# Patient Record
Sex: FEMALE | Race: WHITE | NOT HISPANIC OR LATINO | Employment: FULL TIME | URBAN - METROPOLITAN AREA
[De-identification: names, ages, dates, MRNs, and addresses within clinical notes are randomized per-mention and may not be internally consistent; named-entity substitution may affect disease eponyms.]

---

## 2017-04-17 ENCOUNTER — COMMUNICATION - HEALTHEAST (OUTPATIENT)
Dept: SCHEDULING | Facility: CLINIC | Age: 37
End: 2017-04-17

## 2017-10-06 ENCOUNTER — OFFICE VISIT - HEALTHEAST (OUTPATIENT)
Dept: FAMILY MEDICINE | Facility: CLINIC | Age: 37
End: 2017-10-06

## 2017-10-06 DIAGNOSIS — J01.90 ACUTE SINUSITIS: ICD-10-CM

## 2017-10-06 ASSESSMENT — MIFFLIN-ST. JEOR: SCORE: 1317.82

## 2018-05-11 ENCOUNTER — OFFICE VISIT - HEALTHEAST (OUTPATIENT)
Dept: FAMILY MEDICINE | Facility: CLINIC | Age: 38
End: 2018-05-11

## 2018-05-11 DIAGNOSIS — Z71.84 TRAVEL ADVICE ENCOUNTER: ICD-10-CM

## 2018-05-11 RX ORDER — ATOVAQUONE AND PROGUANIL HYDROCHLORIDE 250; 100 MG/1; MG/1
1 TABLET, FILM COATED ORAL
Qty: 14 TABLET | Refills: 0 | Status: SHIPPED | OUTPATIENT
Start: 2018-05-11

## 2018-05-11 ASSESSMENT — MIFFLIN-ST. JEOR: SCORE: 1290.6

## 2018-06-28 ENCOUNTER — COMMUNICATION - HEALTHEAST (OUTPATIENT)
Dept: SCHEDULING | Facility: CLINIC | Age: 38
End: 2018-06-28

## 2021-05-30 ENCOUNTER — RECORDS - HEALTHEAST (OUTPATIENT)
Dept: ADMINISTRATIVE | Facility: CLINIC | Age: 41
End: 2021-05-30

## 2021-05-31 ENCOUNTER — RECORDS - HEALTHEAST (OUTPATIENT)
Dept: ADMINISTRATIVE | Facility: CLINIC | Age: 41
End: 2021-05-31

## 2021-05-31 VITALS — BODY MASS INDEX: 22.5 KG/M2 | HEIGHT: 66 IN | WEIGHT: 140 LBS

## 2021-06-01 VITALS — HEIGHT: 66 IN | WEIGHT: 134 LBS | BODY MASS INDEX: 21.53 KG/M2

## 2021-06-13 NOTE — PROGRESS NOTES
"  Chief Complaint   Patient presents with     Cough     productive x5-6 days         HPI:   Dayanara Roman is a 37 y.o. female has been sick for the last week.  Started with sore throat, developed cough, productive.  Mild shortness of breath.  No fever.   Got sick quickly.  Looser stools. No vomiting.  Some ear pain, pressure with flight earlier this week.  Intermittent sore throat.  No myalgias.  No dysuria.  No rash.  Intermittent headache.  No history of asthma.  No history of allergies.   Aleve--hoping it would help with chest congestion and post nasal drainage.  States she gets a cold and then gets a bad cough with productive cough. Never happens in the summer.      ROS:  Constitutional: no fever  Eyes: negative   ENT: as per HPI  Respiratory: as per HPI   CV: no chest pain  GI: as per HPI  : as per HPI  SKIN: no rash  MS: as per HPI  NEURO: as per HPI     Medications:  Current Outpatient Prescriptions on File Prior to Visit   Medication Sig Dispense Refill     etonogestrel-ethinyl estradiol (NUVARING) 0.12-0.015 mg/24 hr vaginal ring Insert 1 each into the vagina every 28 days. Insert vaginally and leave in place for 3 consecutive weeks, then remove for 1 week.       adapalene (DIFFERIN) 0.1 % cream as needed.        No current facility-administered medications on file prior to visit.          Social History:  Social History   Substance Use Topics     Smoking status: Never Smoker     Smokeless tobacco: Never Used     Alcohol use 6.0 oz/week     10 Glasses of wine per week         Physical Exam:   Vitals:    10/06/17 0949   BP: 110/64   Patient Site: Right Arm   Patient Position: Sitting   Cuff Size: Adult Regular   Pulse: 72   Resp: 16   Temp: 98  F (36.7  C)   TempSrc: Oral   Weight: 140 lb (63.5 kg)   Height: 5' 5.75\" (1.67 m)       GENERAL:   Alert. Oriented.  EYES: Clear  HENT:  Ears: R TM pearly gray. Normal landmarks. L TM pearly gray.  Normal landmarks  Nose: Clear.  Sinuses: Nontender.  Oropharynx:  " No erythema. No exudate.  NECK: Supple. No adenopathy.  LUNGS: Clear to ascultation.  No crackles.  No wheezing  HEART: RRR  SKIN:  No rash.         Assessment/Plan:    1. Acute sinusitis  albuterol sulfate 90 mcg/actuation AePB    fluticasone (FLONASE) 50 mcg/actuation nasal spray      Appears viral with post nasal drainage contributing to cough.  She doesn't give a clear h/o allergies or asthma, although she states she was sick a lot as a kid.  Recommended afrin no longer than 4 days, sudafed.    Start fluticasone nasal spray--plan to use for a month.  Albuterol inhaler 2 puffs tid and prn 7-10 days then as needed.  Cough syrup as needed.    There is nothing alarming in her history.  She could have some environmental allergies contributing to the symptoms that start with a cold.  May have subclinical wheezing.  Discussed starting antihistamine in the fall and using through the winter,  Starting decongestant and fluticasone as soon as she gets a cold.      Recheck for fever, worsening or not improving.        The following portions of the patient's history were reviewed and updated as appropriate: allergies, current medications, past family history, past medical history, past social history, past surgical history and problem list.    Delon Still MD      10/6/2017         no

## 2021-06-17 NOTE — PROGRESS NOTES
"[facility] Note    Patient Name: Dayanara Roman  Patient Age: 37 y.o.  YOB: 1980  MRN: [id number]    Date of visit: 5/11/2018    Patient Active Problem List   Diagnosis     Gallbladder Disease     Dermatitis     Social History     Social History Narrative     Outpatient Encounter Prescriptions as of 5/11/2018   Medication Sig Dispense Refill     atovaquone-proguanil (MALARONE) 250-100 mg Tab Take 1 tablet by mouth daily with breakfast. 14 tablet 0     typhoid (VIVOTIF) SR capsule Take 1 capsule by mouth every other day for 4 doses. 4 capsule 0     [DISCONTINUED] albuterol sulfate 90 mcg/actuation AePB Inhale 2 puffs 3 (three) times a day. 1 each 11     [DISCONTINUED] etonogestrel-ethinyl estradiol (NUVARING) 0.12-0.015 mg/24 hr vaginal ring Insert 1 each into the vagina every 28 days. Insert vaginally and leave in place for 3 consecutive weeks, then remove for 1 week.       [DISCONTINUED] fluticasone (FLONASE) 50 mcg/actuation nasal spray 2 sprays into each nostril daily. 16 g 11     No facility-administered encounter medications on file as of 5/11/2018.        Chief Complaint:   Chief Complaint   Patient presents with     Travel Consult     Traveling to the [facility]        HPI:   Going to [facility] 5/28 for 5 days.  [place] [facility] [place]    Planned Activities: some indoors outside  Elevation/diving/wilderness: n  Chronic health conditions: n  Vaccination hx (routine and travel):has had yellow fever vaccine.  No hepatitis B vaccination  Medications: no  Any allergies or hx severe reaction (anaphylaxis):no  Plan for food: careful   Travel Insurance/evacuation insurance: has  Not trying to become pregnant right now.      Diptheria outbreak in [facility].     ROS: Pertinent ros findings in hpi, all other systems negative.    Objective/Physical Exam:     /68  Pulse 82  Ht 5' 5.75\" (1.67 m)  Wt 134 lb (60.8 kg)  BMI 21.79 kg/m2    Gen: NAD, conversant, appears age, " well-kempt  Skin: warm, dry, no rash, pallor cyanosis  HENT: normocephalic atraumatic, MMM, no oral lesions, otorrhea, rhinorrhea. TM's normal bilaterally.  Eyes: non-icteric, extra-ocular movements intact, PERRL, conjunctivae not injected. Holding eyes open comfortably, no drainage.  CV: NRRR no m/r/g, no peripheral edema. no JVD.  Resp: CTAB no w/r/r, normal respiratory effort  GI: soft, non-tender, non-distended. No masses.  MSK: no muscle or joint swelling.  Neuro: no dysarthria or gross asymmetry  Psych: full affect, oriented x 3  Lymph: No significant cervical lymphadenopathy  Hematologic: No petechiae or purpura.      Assessment/Plan:    Encounter Diagnoses   Name Primary?     Travel advice encounter Yes       Orders Placed This Encounter   Procedures     Tdap vaccine,  8yo or older,  IM       No results found for this or any previous visit (from the past 24 hour(s)).     Recommended vaccinations:Hepatitis A and B (if haven't had already - patient will obtain vaccine records and let me know). Typhoid.  Malaria prophylaxis:recommended - start malarone 5/27 (needs to be 10 days after last dose of vivotif which should be 5/17)  Traveler's diarrhea prophylaxis: pepto bismol    Food: Recommend only consuming food from reputable sources and avoiding street food.  Avoid fresh vegetables and fruits without a peel.  Eat only food that is fresh and steaming hot.    Drink: Stay hydrated.  Drink only bottled beverages that are still sealed.  Do not use ice.  Use sterile water (bottled, boiled and cooled, adequately filtered/treated) for drinking, brushing teeth.  Don't open mouth in shower.    Mosquito/insect avoidance: Cover skin with clothing as much as possible.  Apply insect repellant regularly.  Avoid being outdoors at times of day when local mosquitos are most active.  Use mosquito nets.  Can treat clothing with insect repellant.     Sun: Apply sunscreen regularly.    Swimming: Avoid swimming in freshwater.  Avoid  walking barefoot at any time including beaches.    Animals: Avoid local animals as much as possible.  If you are bitten by an animal, seek medical attention immediately for possible rabies treatment/prevention.    DVT (blood clot) prevention: Recommend getting up to walk every 1-2 hours while in transit. Frequently flex/extend ankles and knees. Avoid sedatives/alcohol. Consider wearing graduated compression stockings 15-30 mmhg.    Medical: Take along adequate supply of any medication you use regularly. Seek out a reputable clinic/hospital that you can utilize if needed.  I recommend that you acquire health insurance/evacuation insurance for your trip. Visit CDC website: https://wwwnc.cdc.gov/travel/page/getting-health-care-abroad    Infectious disease:  Standard infectious disease precautions should be exercised abroad regarding frequent hand washing/, sexual contact, etc. Avoid contact with body fluids when possible and seek medical attention if there is contact with body fluid.     Packing: I recommend that you consult the cdc recommendations for items to consider packing: https://wwwnc.cdc.gov/travel/page/pack-smart    Not currently pregnant or trying to become pregnant.  We did discuss that it would be recommended to not try to become pregnant within 2 months of travel (6 months for spouse).    Counseled patient regarding treatments, treatment options, risks and benefits and diagnosis.  The patient was interactive, attentive, verbalized understanding, and we discussed plan.     Augusto Argueta MD

## 2021-06-26 ENCOUNTER — HEALTH MAINTENANCE LETTER (OUTPATIENT)
Age: 41
End: 2021-06-26

## 2021-10-16 ENCOUNTER — HEALTH MAINTENANCE LETTER (OUTPATIENT)
Age: 41
End: 2021-10-16

## 2022-09-25 ENCOUNTER — HEALTH MAINTENANCE LETTER (OUTPATIENT)
Age: 42
End: 2022-09-25

## 2023-12-23 ENCOUNTER — HEALTH MAINTENANCE LETTER (OUTPATIENT)
Age: 43
End: 2023-12-23

## 2024-03-02 ENCOUNTER — HEALTH MAINTENANCE LETTER (OUTPATIENT)
Age: 44
End: 2024-03-02

## 2024-08-09 ENCOUNTER — HOSPITAL ENCOUNTER (EMERGENCY)
Facility: HOSPITAL | Age: 44
Discharge: HOME | End: 2024-08-09
Attending: EMERGENCY MEDICINE | Admitting: EMERGENCY MEDICINE
Payer: COMMERCIAL

## 2024-08-09 ENCOUNTER — TELEPHONE (OUTPATIENT)
Dept: FAMILY MEDICINE | Facility: CLINIC | Age: 44
End: 2024-08-09
Payer: COMMERCIAL

## 2024-08-09 VITALS
HEART RATE: 85 BPM | OXYGEN SATURATION: 98 % | SYSTOLIC BLOOD PRESSURE: 98 MMHG | RESPIRATION RATE: 19 BRPM | WEIGHT: 131.84 LBS | BODY MASS INDEX: 21.97 KG/M2 | TEMPERATURE: 98.5 F | HEIGHT: 65 IN | DIASTOLIC BLOOD PRESSURE: 54 MMHG

## 2024-08-09 DIAGNOSIS — E61.1 LOW IRON: ICD-10-CM

## 2024-08-09 LAB
ABO/RH(D): NORMAL
ALBUMIN SERPL BCG-MCNC: 4.8 G/DL (ref 3.5–5.2)
ALP SERPL-CCNC: 73 U/L (ref 40–150)
ALT SERPL W P-5'-P-CCNC: 18 U/L (ref 0–50)
ANION GAP SERPL CALCULATED.3IONS-SCNC: 15 MMOL/L (ref 7–15)
ANTIBODY SCREEN: NEGATIVE
AST SERPL W P-5'-P-CCNC: 17 U/L (ref 0–45)
BASOPHILS # BLD AUTO: 0 10E3/UL (ref 0–0.2)
BASOPHILS NFR BLD AUTO: 1 %
BILIRUB DIRECT SERPL-MCNC: <0.2 MG/DL (ref 0–0.3)
BILIRUB SERPL-MCNC: 0.5 MG/DL
BUN SERPL-MCNC: 8.4 MG/DL (ref 6–20)
CALCIUM SERPL-MCNC: 9.6 MG/DL (ref 8.8–10.4)
CHLORIDE SERPL-SCNC: 100 MMOL/L (ref 98–107)
CREAT SERPL-MCNC: 0.59 MG/DL (ref 0.51–0.95)
EGFRCR SERPLBLD CKD-EPI 2021: >90 ML/MIN/1.73M2
EOSINOPHIL # BLD AUTO: 0.1 10E3/UL (ref 0–0.7)
EOSINOPHIL NFR BLD AUTO: 4 %
ERYTHROCYTE [DISTWIDTH] IN BLOOD BY AUTOMATED COUNT: 12.2 % (ref 10–15)
GLUCOSE SERPL-MCNC: 86 MG/DL (ref 70–99)
HCG SERPL QL: NEGATIVE
HCO3 SERPL-SCNC: 24 MMOL/L (ref 22–29)
HCT VFR BLD AUTO: 39.2 % (ref 35–47)
HEMOCCULT STL QL: NEGATIVE
HGB BLD-MCNC: 13.2 G/DL (ref 11.7–15.7)
IMM GRANULOCYTES # BLD: 0 10E3/UL
IMM GRANULOCYTES NFR BLD: 0 %
IRON BINDING CAPACITY (ROCHE): 308 UG/DL (ref 240–430)
IRON SATN MFR SERPL: 10 % (ref 15–46)
IRON SERPL-MCNC: 31 UG/DL (ref 37–145)
LIPASE SERPL-CCNC: 33 U/L (ref 13–60)
LYMPHOCYTES # BLD AUTO: 1.8 10E3/UL (ref 0.8–5.3)
LYMPHOCYTES NFR BLD AUTO: 54 %
MAGNESIUM SERPL-MCNC: 2.1 MG/DL (ref 1.7–2.3)
MCH RBC QN AUTO: 30.3 PG (ref 26.5–33)
MCHC RBC AUTO-ENTMCNC: 33.7 G/DL (ref 31.5–36.5)
MCV RBC AUTO: 90 FL (ref 78–100)
MONOCYTES # BLD AUTO: 0.4 10E3/UL (ref 0–1.3)
MONOCYTES NFR BLD AUTO: 13 %
NEUTROPHILS # BLD AUTO: 0.9 10E3/UL (ref 1.6–8.3)
NEUTROPHILS NFR BLD AUTO: 28 %
NRBC # BLD AUTO: 0 10E3/UL
NRBC BLD AUTO-RTO: 0 /100
PLATELET # BLD AUTO: 178 10E3/UL (ref 150–450)
POTASSIUM SERPL-SCNC: 3.7 MMOL/L (ref 3.4–5.3)
PROT SERPL-MCNC: 8 G/DL (ref 6.4–8.3)
RBC # BLD AUTO: 4.36 10E6/UL (ref 3.8–5.2)
RETICS # AUTO: 0.03 10E6/UL (ref 0.03–0.1)
RETICS/RBC NFR AUTO: 0.7 % (ref 0.5–2)
SODIUM SERPL-SCNC: 139 MMOL/L (ref 135–145)
SPECIMEN EXPIRATION DATE: NORMAL
TSH SERPL DL<=0.005 MIU/L-ACNC: 1.6 UIU/ML (ref 0.3–4.2)
WBC # BLD AUTO: 3.4 10E3/UL (ref 4–11)

## 2024-08-09 PROCEDURE — 85025 COMPLETE CBC W/AUTO DIFF WBC: CPT | Performed by: EMERGENCY MEDICINE

## 2024-08-09 PROCEDURE — 80053 COMPREHEN METABOLIC PANEL: CPT | Performed by: EMERGENCY MEDICINE

## 2024-08-09 PROCEDURE — 99283 EMERGENCY DEPT VISIT LOW MDM: CPT

## 2024-08-09 PROCEDURE — 83735 ASSAY OF MAGNESIUM: CPT | Performed by: EMERGENCY MEDICINE

## 2024-08-09 PROCEDURE — 83540 ASSAY OF IRON: CPT | Performed by: EMERGENCY MEDICINE

## 2024-08-09 PROCEDURE — 36415 COLL VENOUS BLD VENIPUNCTURE: CPT | Performed by: EMERGENCY MEDICINE

## 2024-08-09 PROCEDURE — 86900 BLOOD TYPING SEROLOGIC ABO: CPT | Performed by: EMERGENCY MEDICINE

## 2024-08-09 PROCEDURE — 82272 OCCULT BLD FECES 1-3 TESTS: CPT | Performed by: EMERGENCY MEDICINE

## 2024-08-09 PROCEDURE — 83690 ASSAY OF LIPASE: CPT | Performed by: EMERGENCY MEDICINE

## 2024-08-09 PROCEDURE — 82728 ASSAY OF FERRITIN: CPT | Performed by: EMERGENCY MEDICINE

## 2024-08-09 PROCEDURE — 83550 IRON BINDING TEST: CPT | Performed by: EMERGENCY MEDICINE

## 2024-08-09 PROCEDURE — 84443 ASSAY THYROID STIM HORMONE: CPT | Performed by: EMERGENCY MEDICINE

## 2024-08-09 PROCEDURE — 85045 AUTOMATED RETICULOCYTE COUNT: CPT | Performed by: EMERGENCY MEDICINE

## 2024-08-09 PROCEDURE — 84703 CHORIONIC GONADOTROPIN ASSAY: CPT | Performed by: EMERGENCY MEDICINE

## 2024-08-09 ASSESSMENT — COLUMBIA-SUICIDE SEVERITY RATING SCALE - C-SSRS
2. HAVE YOU ACTUALLY HAD ANY THOUGHTS OF KILLING YOURSELF IN THE PAST MONTH?: NO
1. IN THE PAST MONTH, HAVE YOU WISHED YOU WERE DEAD OR WISHED YOU COULD GO TO SLEEP AND NOT WAKE UP?: NO
6. HAVE YOU EVER DONE ANYTHING, STARTED TO DO ANYTHING, OR PREPARED TO DO ANYTHING TO END YOUR LIFE?: NO

## 2024-08-09 ASSESSMENT — ACTIVITIES OF DAILY LIVING (ADL)
ADLS_ACUITY_SCORE: 35

## 2024-08-09 NOTE — ED PROVIDER NOTES
"EMERGENCY DEPARTMENT NOTE     Name: Dayanara Roman    Age/Sex: 44 year old female   MRN: [id number]   Evaluation Date & Time:  No admission date for patient encounter.    PCP:    Nimo Pinto   ED Provider: Roscoe Richards D.O.       CHIEF COMPLAINT    Abnormal Labs       DIAGNOSIS & DISPOSITION/MEDICAL DECISION MAKING   No diagnosis found.    Dayanara Roman is a 44 year old female with relevant past history of low iron who presents to the emergency department for evaluation of abnormal labs.    Differential  diagnosis considered included but not limited to GI bleeding, anemia    Medical Decision Making  EMERGENCY DEPARTMENT COURSE   8:35 PM I met with the patient to gather history and to perform my initial exam.  We discussed treatment options and the plan for care while in the Emergency Department.  Triage note reviewed: Iron level decreased from her checked yesterday  Iron 14 L and prior was 95 last July.  Having darker brown loose stools for 5 days.  Already on iron supplement. Sees a natupathic physician who is checking these levels.  Having upper abdominal cramps and feeling bloating.  Concern because she is leaving on Monday international travel.  Due to see GI MD early September  Triage VS:BP 98/54   Pulse 85   Temp 98.5  F (36.9  C) (Oral)   Resp 19   Ht 1.651 m (5' 5\")   Wt 59.8 kg (131 lb 13.4 oz)   LMP  (Within Weeks)   SpO2 98%   BMI 21.94 kg/m    Pertinent physical exam findings  Abdomen: Soft nontender, positive bowel sounds.  No organomegaly or mass  Rectal: Empty vault, no melena, small amount of stool guaiac negative  Lab: Hemoglobin 13.2, comprehensive metabolic profile within normal limits, total iron 31, iron saturation 10% iron binding capacity 308, ferritin 156,    Patient's hemoglobin is normal, iron stores on level are low but seem to be improving from outpatient values.    Patient was very concerned about significance of low iron level and possibility of GI bleeding.  I " discussed case with hematology and gastroenterology and they are in agreement that current plan for continuing iron replacement with outpatient follow-up is appropriate.  Patient is currently on iron replacement and will continue.  Patient has had diarrhea over the past week but unable to provide stool studies here and have been ordered for outpatient follow-up.  Patient was most concerned about upcoming travel to [facility] for 2 weeks and was reassured.  Patient has scheduled appointment with GI in early September will keep this appointment and will discuss at follow-up with primary care physician possible hematology referral if persistent low iron.  Return criteria discussed with the patient has any symptoms of GI bleeding including melena will return to the emergency department.  9:12 PM I talked to Oncology-Hematology and Gi regarding patient.   9:40 PM I performed rectal exam which showed empty vault without melena.  Small amount of stool present guaiac negative.      ED Course as of 08/09/24 2141   Fri Aug 09, 2024   2111 Iron Sat Index(!): 10     Diagnostic studies:  No orders to display     Labs Ordered and Resulted from Time of ED Arrival to Time of ED Departure   IRON AND IRON BINDING CAPACITY - Abnormal       Result Value    Iron 31 (*)     Iron Binding Capacity 308      Iron Sat Index 10 (*)    CBC WITH PLATELETS AND DIFFERENTIAL - Abnormal    WBC Count 3.4 (*)     RBC Count 4.36      Hemoglobin 13.2      Hematocrit 39.2      MCV 90      MCH 30.3      MCHC 33.7      RDW 12.2      Platelet Count 178      % Neutrophils 28      % Lymphocytes 54      % Monocytes 13      % Eosinophils 4      % Basophils 1      % Immature Granulocytes 0      NRBCs per 100 WBC 0      Absolute Neutrophils 0.9 (*)     Absolute Lymphocytes 1.8      Absolute Monocytes 0.4      Absolute Eosinophils 0.1      Absolute Basophils 0.0      Absolute Immature Granulocytes 0.0      Absolute NRBCs 0.0     BASIC METABOLIC PANEL - Normal    Sodium  139      Potassium 3.7      Chloride 100      Carbon Dioxide (CO2) 24      Anion Gap 15      Urea Nitrogen 8.4      Creatinine 0.59      GFR Estimate >90      Calcium 9.6      Glucose 86     MAGNESIUM - Normal    Magnesium 2.1     TSH WITH FREE T4 REFLEX - Normal    TSH 1.60     RETICULOCYTE COUNT - Normal    % Reticulocyte 0.7      Absolute Reticulocyte 0.029     HCG QUALITATIVE PREGNANCY - Normal    hCG Serum Qualitative Negative     HEPATIC FUNCTION PANEL - Normal    Protein Total 8.0      Albumin 4.8      Bilirubin Total 0.5      Alkaline Phosphatase 73      AST 17      ALT 18      Bilirubin Direct <0.20     LIPASE - Normal    Lipase 33     FERRITIN   TYPE AND SCREEN, ADULT    ABO/RH(D) B POS      Antibody Screen Negative      SPECIMEN EXPIRATION DATE [id number]     ABO/RH TYPE AND SCREEN     ED INTERVENTIONS   Medications - No data to display  Discharge Vital Signs:  DISCHARGE MEDICATIONS        Review of your medicines        UNREVIEWED medicines. Ask your doctor about these medicines        Dose / Directions   atovaquone-proguanil 250-100 MG tablet  Commonly known as: MALARONE      Dose: 1 tablet  [ATOVAQUONE-PROGUANIL (MALARONE) 250-100 MG TAB] Take 1 tablet by mouth daily with breakfast.  Quantity: 14 tablet  Refills: 0     FLUTICASONE PROPIONATE (NASAL) 50 MCG/ACT Susp  Used for: Other chronic sinusitis      Dose: 1 spray  [FLUTICASONE PROPIONATE (FLONASE) 50 MCG/ACTUATION NASAL SPRAY] 1 spray into each nostril daily.  Quantity: 16 g  Refills: 0            DISPOSITION: Home                   Triage note reviewed:    History:  Supplemental history from: Documented in chart  External Record(s) reviewed: Documented in chart and Outpatient Record: Patient was seen at [facility] on 08/09/24    Work Up:  Chart documentation includes differential considered and any EKGs or imaging independently interpreted by provider, where specified.  In additional to work up documented, I considered the  following work up: Documented in chart, if applicable.    External consultation:  Discussion of management with another provider: Documented in chart, if applicable    Complicating factors:  Care impacted by chronic illness: N/A  Care affected by social determinants of health: N/A    Disposition considerations: Discharge. I recommended the patient continue their current prescription strength medication(s): Iron. .    At the conclusion of the encounter I discussed the results of all of the tests and the disposition. The questions were answered. The patient or family acknowledged understanding and was agreeable with the care plan.    TOTAL CRITICAL CARE TIME (EXCLUDING PROCEDURES): Not applicable    PROCEDURES:   None    INFORMATION SOURCE AND LIMITATIONS    History/Exam limitations: None  Patient information was obtained from: Patient   Use of : N/A  HISTORY OF PRESENT ILLNESS   marisol Roman is a 44 year old female with relevant past history of low iron who presents to the emergency department for evaluation of abnormal labs.    The patient reports her iron level has decreased since yesterday. She was seen at her primary clinic yesterday and had lab work completed. Patient states her primary provider is concerned for her low iron level of 14 compared to it being 95 in July. She also reports intermittent loose stool for the past three weeks. Patient is on an iron supplement and reports melena/black stool this past week. She Is leaving for [facility] on Monday (08/12/24) and wanted to get check up to make sure everything was okay before she left. No other complaints at this time.     REVIEW OF SYSTEMS:   All other systems reviewed and are negative except as noted above in HPI.    PATIENT HISTORY   History reviewed. No pertinent past medical history.  Patient Active Problem List   Diagnosis    Gallbladder Disease    Dermatitis     No past surgical history on file.    No Known Allergies    OUTPATIENT MEDICATIONS  "    New Prescriptions    No medications on file      Vitals:    08/09/24 1701   BP: 98/54   Pulse: 85   Resp: 19   Temp: 98.5  F (36.9  C)   TempSrc: Oral   SpO2: 98%   Weight: 59.8 kg (131 lb 13.4 oz)   Height: 1.651 m (5' 5\")       Physical Exam   Constitutional: Oriented to person, place, and time. Appears well-developed and well-nourished.   Cardiovascular: Normal rate, regular rhythm and normal heart sounds.    Pulmonary/Chest: Normal effort  and breath sounds normal.   Abdominal: Soft. Bowel sounds are normal.   Rectal: Empty vault, no melena, small amount of stool guaiac negative    DIAGNOSTICS    LABORATORY FINDINGS (REVIEWED AND INTERPRETED):  Labs Ordered and Resulted from Time of ED Arrival to Time of ED Departure   IRON AND IRON BINDING CAPACITY - Abnormal       Result Value    Iron 31 (*)     Iron Binding Capacity 308      Iron Sat Index 10 (*)    CBC WITH PLATELETS AND DIFFERENTIAL - Abnormal    WBC Count 3.4 (*)     RBC Count 4.36      Hemoglobin 13.2      Hematocrit 39.2      MCV 90      MCH 30.3      MCHC 33.7      RDW 12.2      Platelet Count 178      % Neutrophils 28      % Lymphocytes 54      % Monocytes 13      % Eosinophils 4      % Basophils 1      % Immature Granulocytes 0      NRBCs per 100 WBC 0      Absolute Neutrophils 0.9 (*)     Absolute Lymphocytes 1.8      Absolute Monocytes 0.4      Absolute Eosinophils 0.1      Absolute Basophils 0.0      Absolute Immature Granulocytes 0.0      Absolute NRBCs 0.0     BASIC METABOLIC PANEL - Normal    Sodium 139      Potassium 3.7      Chloride 100      Carbon Dioxide (CO2) 24      Anion Gap 15      Urea Nitrogen 8.4      Creatinine 0.59      GFR Estimate >90      Calcium 9.6      Glucose 86     MAGNESIUM - Normal    Magnesium 2.1     TSH WITH FREE T4 REFLEX - Normal    TSH 1.60     RETICULOCYTE COUNT - Normal    % Reticulocyte 0.7      Absolute Reticulocyte 0.029     HCG QUALITATIVE PREGNANCY - Normal    hCG Serum Qualitative Negative     HEPATIC " FUNCTION PANEL - Normal    Protein Total 8.0      Albumin 4.8      Bilirubin Total 0.5      Alkaline Phosphatase 73      AST 17      ALT 18      Bilirubin Direct <0.20     LIPASE - Normal    Lipase 33     FERRITIN   TYPE AND SCREEN, ADULT    ABO/RH(D) B POS      Antibody Screen Negative      SPECIMEN EXPIRATION DATE [id number]     ABO/RH TYPE AND SCREEN         IMAGING (REVIEWED AND INTERPRETED):  No orders to display         ECG (REVIEWED AND INTERPRETED):           I, Gay Rashid, am serving as a scribe to document services personally performed by Roscoe Richards D.O., based on my observation and the provider s statements to me.    I, Roscoe Richards D.O., attest that Gay Rashid is acting in a scribe capacity, has observed my performance of the services and has documented them in accordance with my direction.    Roscoe Richards D.O.  EMERGENCY MEDICINE   08/09/24  [facility] EMERGENCY DEPARTMENT  [address] [place] [id number]  [phone]  Dept: [phone]      Roscoe Richards DO  08/12/24 0220

## 2024-08-09 NOTE — TELEPHONE ENCOUNTER
Patient calling regarding sx and PCP is concerned for internal bleeding and patient is wondering best location to have evaluation for these concerns.     Writer recommended patient be seen in ER for these concerns as they have more appropriate resources available for a concern like this.    CHRISTINE HuitronN, RN  [facility]

## 2024-08-09 NOTE — ED TRIAGE NOTES
Iron level decreased from her checked yesterday  Iron 14 L and prior was 95 last July.  Having darker brown loose stools for 5 days.  Already on iron supplement. Sees a natupathic physician who is checking these levels.  Having upper abdominal cramps and feeling bloating.  Concern because she is leaving on Monday international travel.  Due to see GI MD early September

## 2024-08-10 LAB — FERRITIN SERPL-MCNC: 156 NG/ML (ref 6–175)

## 2024-08-10 NOTE — DISCHARGE INSTRUCTIONS
Continue 2 tablets of the iron formulation that you are taking daily.  Follow-up was scheduled with the gastroenterologist.  Return to the emergency department or seek care if you have black or tar-like stools, shortness of breath or feel dizzy.

## 2025-01-18 ENCOUNTER — HEALTH MAINTENANCE LETTER (OUTPATIENT)
Age: 45
End: 2025-01-18